# Patient Record
Sex: FEMALE | Race: NATIVE HAWAIIAN OR OTHER PACIFIC ISLANDER | NOT HISPANIC OR LATINO | URBAN - METROPOLITAN AREA
[De-identification: names, ages, dates, MRNs, and addresses within clinical notes are randomized per-mention and may not be internally consistent; named-entity substitution may affect disease eponyms.]

---

## 2020-05-05 ENCOUNTER — EMERGENCY (EMERGENCY)
Facility: HOSPITAL | Age: 40
LOS: 1 days | Discharge: HOME | End: 2020-05-05
Attending: EMERGENCY MEDICINE | Admitting: EMERGENCY MEDICINE
Payer: SELF-PAY

## 2020-05-05 VITALS
HEIGHT: 62 IN | SYSTOLIC BLOOD PRESSURE: 128 MMHG | DIASTOLIC BLOOD PRESSURE: 84 MMHG | RESPIRATION RATE: 18 BRPM | TEMPERATURE: 98 F | HEART RATE: 90 BPM | WEIGHT: 111.99 LBS | OXYGEN SATURATION: 99 %

## 2020-05-05 PROCEDURE — 96372 THER/PROPH/DIAG INJ SC/IM: CPT

## 2020-05-05 PROCEDURE — 99283 EMERGENCY DEPT VISIT LOW MDM: CPT | Mod: 25

## 2020-05-05 PROCEDURE — 99284 EMERGENCY DEPT VISIT MOD MDM: CPT

## 2020-05-05 RX ORDER — FAMOTIDINE 10 MG/ML
1 INJECTION INTRAVENOUS
Qty: 14 | Refills: 0
Start: 2020-05-05 | End: 2020-05-11

## 2020-05-05 RX ORDER — DIPHENHYDRAMINE HCL 50 MG
50 CAPSULE ORAL ONCE
Refills: 0 | Status: COMPLETED | OUTPATIENT
Start: 2020-05-05 | End: 2020-05-05

## 2020-05-05 RX ORDER — DEXAMETHASONE 0.5 MG/5ML
10 ELIXIR ORAL ONCE
Refills: 0 | Status: COMPLETED | OUTPATIENT
Start: 2020-05-05 | End: 2020-05-05

## 2020-05-05 RX ORDER — FAMOTIDINE 10 MG/ML
40 INJECTION INTRAVENOUS ONCE
Refills: 0 | Status: COMPLETED | OUTPATIENT
Start: 2020-05-05 | End: 2020-05-05

## 2020-05-05 RX ORDER — HYDROXYZINE HCL 10 MG
1 TABLET ORAL
Qty: 21 | Refills: 0
Start: 2020-05-05 | End: 2020-05-11

## 2020-05-05 RX ADMIN — FAMOTIDINE 40 MILLIGRAM(S): 10 INJECTION INTRAVENOUS at 18:02

## 2020-05-05 RX ADMIN — Medication 10 MILLIGRAM(S): at 18:02

## 2020-05-05 RX ADMIN — Medication 50 MILLIGRAM(S): at 18:02

## 2020-05-05 NOTE — ED PROVIDER NOTE - CLINICAL SUMMARY MEDICAL DECISION MAKING FREE TEXT BOX
pt w/allergic reaction - hives to l upper arm and r cheek, no vesicles/bites/superimposed inf, no rash to rest of body, no airway compromise or systemic symptoms, given decadron and h2/h1 blockers, allergy prevention discussed, to con't h1/h2 blockers, f/u w/derm or allergist for testing, strict return precautions given

## 2020-05-05 NOTE — ED PROVIDER NOTE - RESPIRATORY, MLM
Breath sounds clear and equal bilaterally. no rales, no rhonchi, no wheezes b/l, speaking rapidly in long, full sentences

## 2020-05-05 NOTE — ED PROVIDER NOTE - ENMT, MLM
Airway patent, Nasal mucosa clear. Mouth with normal mucosa. Throat has no vesicles, no oropharyngeal exudates and uvula is midline. no lesions to mucosa, no stridor, no cervical lymphadenopathy b/l

## 2020-05-05 NOTE — ED ADULT NURSE NOTE - CHPI ED NUR SYMPTOMS NEG
no nausea/no congestion/no difficulty breathing/no wheezing/no shortness of breath/no difficulty swallowing

## 2020-05-05 NOTE — ED ADULT NURSE NOTE - CHIEF COMPLAINT QUOTE
Patient in vascular at this time.      Roel Ndiaye RN  09/24/19 0528 pt arriving to ED for c/c "allergic reaction to R face and L arm" pt states she does not know of specific allergens, but states similar episode in the past. Notable swelling to R face and L upper arm. hx CVA in 2009. No new deficits.

## 2020-05-05 NOTE — ED ADULT TRIAGE NOTE - CHIEF COMPLAINT QUOTE
pt arriving to ED for c/c "allergic reaction to R face and L arm" pt states she does not know of specific allergens, but states similar episode in the past. Notable swelling to R face and L upper arm. hx CVA in 2009. No new deficits.

## 2020-05-05 NOTE — ED PROVIDER NOTE - OBJECTIVE STATEMENT
The pt is a 40 y/o F, who presents to ED c/o redness and itching to R face and L arm - started last night, pt proceeded to apply alcohol to the areas. States redness, swelling and "hotness", has not taken any benadryl, had a similar reaction few wks ago - took benadryl at that time w/good relief, has not f/u w/allergist or derm. No new meds, no new soap, no new detergent, no new foods. Denies sob, dyspnea, throat closing, n/v/d, abd pain, fevers, chills, dizziness, syncope

## 2020-05-05 NOTE — ED PROVIDER NOTE - SKIN, MLM
+ hive to L upper arm, no tend, no breaks in skin, no vesicles, no pustules, no tend, no ascending or descending lymphangitis, R face w/hives and wheels, min swelling to R cheek, no tend, no pustules, no bites or breaks in skin, no rash to rest of body, no TENS, no SJS

## 2020-05-05 NOTE — ED PROVIDER NOTE - PATIENT PORTAL LINK FT
You can access the FollowMyHealth Patient Portal offered by [facility] by registering at the following website: [email or web address]. By joining [employer]’s FollowMyHealth portal, you will also be able to view your health information using other applications (apps) compatible with our system.

## 2020-05-05 NOTE — ED PROVIDER NOTE - ATTENDING CONTRIBUTION TO CARE
pt with CARMEN arm reddness and itching , right facial itching, applied alcohol to areas and worsened, similar outbreak months ago which resolved at that time w Benedryl, otherwise no symptoms, no SOB, no GI symptoms, no new allergens identified, hive to LUE, no cellulitis, also hive to rt cheek , no uvular edema, lungs clear, plan steroids, benedryl pepcid, allergist f/u ,  emergent return instructions were discussed with patient

## 2020-08-24 NOTE — ED ADULT TRIAGE NOTE - INTERNATIONAL TRAVEL
No Graft Cartilage Fenestration Text: The cartilage was fenestrated with a 2mm punch biopsy to help facilitate graft survival and healing.

## 2020-09-18 ENCOUNTER — EMERGENCY (EMERGENCY)
Facility: HOSPITAL | Age: 40
LOS: 1 days | Discharge: HOME | End: 2020-09-18
Attending: EMERGENCY MEDICINE | Admitting: EMERGENCY MEDICINE
Payer: SELF-PAY

## 2020-09-18 VITALS
SYSTOLIC BLOOD PRESSURE: 149 MMHG | HEIGHT: 62 IN | DIASTOLIC BLOOD PRESSURE: 100 MMHG | HEART RATE: 108 BPM | TEMPERATURE: 98 F | WEIGHT: 111.99 LBS | RESPIRATION RATE: 16 BRPM | OXYGEN SATURATION: 99 %

## 2020-09-18 VITALS
SYSTOLIC BLOOD PRESSURE: 116 MMHG | DIASTOLIC BLOOD PRESSURE: 70 MMHG | HEART RATE: 78 BPM | RESPIRATION RATE: 16 BRPM | TEMPERATURE: 98 F | OXYGEN SATURATION: 99 %

## 2020-09-18 LAB
ALBUMIN SERPL ELPH-MCNC: 5.1 G/DL — HIGH (ref 3.3–5)
ALP SERPL-CCNC: 58 U/L — SIGNIFICANT CHANGE UP (ref 40–120)
ALT FLD-CCNC: 15 U/L — SIGNIFICANT CHANGE UP (ref 10–45)
ANION GAP SERPL CALC-SCNC: 13 MMOL/L — SIGNIFICANT CHANGE UP (ref 5–17)
APPEARANCE UR: CLEAR — SIGNIFICANT CHANGE UP
APTT BLD: 30.8 SEC — SIGNIFICANT CHANGE UP (ref 27.5–35.5)
AST SERPL-CCNC: 19 U/L — SIGNIFICANT CHANGE UP (ref 10–40)
BASOPHILS # BLD AUTO: 0.08 K/UL — SIGNIFICANT CHANGE UP (ref 0–0.2)
BASOPHILS NFR BLD AUTO: 0.8 % — SIGNIFICANT CHANGE UP (ref 0–2)
BILIRUB SERPL-MCNC: 0.6 MG/DL — SIGNIFICANT CHANGE UP (ref 0.2–1.2)
BILIRUB UR-MCNC: NEGATIVE — SIGNIFICANT CHANGE UP
BUN SERPL-MCNC: 15 MG/DL — SIGNIFICANT CHANGE UP (ref 7–23)
CALCIUM SERPL-MCNC: 9.3 MG/DL — SIGNIFICANT CHANGE UP (ref 8.4–10.5)
CHLORIDE SERPL-SCNC: 100 MMOL/L — SIGNIFICANT CHANGE UP (ref 96–108)
CK MB CFR SERPL CALC: 1.9 NG/ML — SIGNIFICANT CHANGE UP (ref 0–6.7)
CK SERPL-CCNC: 94 U/L — SIGNIFICANT CHANGE UP (ref 25–170)
CO2 SERPL-SCNC: 24 MMOL/L — SIGNIFICANT CHANGE UP (ref 22–31)
COLOR SPEC: YELLOW — SIGNIFICANT CHANGE UP
CREAT SERPL-MCNC: 0.52 MG/DL — SIGNIFICANT CHANGE UP (ref 0.5–1.3)
DIFF PNL FLD: NEGATIVE — SIGNIFICANT CHANGE UP
EOSINOPHIL # BLD AUTO: 0.22 K/UL — SIGNIFICANT CHANGE UP (ref 0–0.5)
EOSINOPHIL NFR BLD AUTO: 2.2 % — SIGNIFICANT CHANGE UP (ref 0–6)
GLUCOSE SERPL-MCNC: 125 MG/DL — HIGH (ref 70–99)
GLUCOSE UR QL: NEGATIVE — SIGNIFICANT CHANGE UP
HCG SERPL-ACNC: <0 MIU/ML — SIGNIFICANT CHANGE UP
HCG UR QL: NEGATIVE — SIGNIFICANT CHANGE UP
HCT VFR BLD CALC: 42.3 % — SIGNIFICANT CHANGE UP (ref 34.5–45)
HGB BLD-MCNC: 13.8 G/DL — SIGNIFICANT CHANGE UP (ref 11.5–15.5)
IMM GRANULOCYTES NFR BLD AUTO: 0.3 % — SIGNIFICANT CHANGE UP (ref 0–1.5)
INR BLD: 1.09 — SIGNIFICANT CHANGE UP (ref 0.88–1.16)
KETONES UR-MCNC: ABNORMAL MG/DL
LEUKOCYTE ESTERASE UR-ACNC: NEGATIVE — SIGNIFICANT CHANGE UP
LYMPHOCYTES # BLD AUTO: 2.45 K/UL — SIGNIFICANT CHANGE UP (ref 1–3.3)
LYMPHOCYTES # BLD AUTO: 24.1 % — SIGNIFICANT CHANGE UP (ref 13–44)
MCHC RBC-ENTMCNC: 31.4 PG — SIGNIFICANT CHANGE UP (ref 27–34)
MCHC RBC-ENTMCNC: 32.6 GM/DL — SIGNIFICANT CHANGE UP (ref 32–36)
MCV RBC AUTO: 96.4 FL — SIGNIFICANT CHANGE UP (ref 80–100)
MONOCYTES # BLD AUTO: 0.81 K/UL — SIGNIFICANT CHANGE UP (ref 0–0.9)
MONOCYTES NFR BLD AUTO: 8 % — SIGNIFICANT CHANGE UP (ref 2–14)
NEUTROPHILS # BLD AUTO: 6.56 K/UL — SIGNIFICANT CHANGE UP (ref 1.8–7.4)
NEUTROPHILS NFR BLD AUTO: 64.6 % — SIGNIFICANT CHANGE UP (ref 43–77)
NITRITE UR-MCNC: NEGATIVE — SIGNIFICANT CHANGE UP
NRBC # BLD: 0 /100 WBCS — SIGNIFICANT CHANGE UP (ref 0–0)
PH UR: 6.5 — SIGNIFICANT CHANGE UP (ref 5–8)
PLATELET # BLD AUTO: 199 K/UL — SIGNIFICANT CHANGE UP (ref 150–400)
POTASSIUM SERPL-MCNC: 3.4 MMOL/L — LOW (ref 3.5–5.3)
POTASSIUM SERPL-SCNC: 3.4 MMOL/L — LOW (ref 3.5–5.3)
PROT SERPL-MCNC: 7.7 G/DL — SIGNIFICANT CHANGE UP (ref 6–8.3)
PROT UR-MCNC: NEGATIVE MG/DL — SIGNIFICANT CHANGE UP
PROTHROM AB SERPL-ACNC: 13 SEC — SIGNIFICANT CHANGE UP (ref 10.6–13.6)
RBC # BLD: 4.39 M/UL — SIGNIFICANT CHANGE UP (ref 3.8–5.2)
RBC # FLD: 11.9 % — SIGNIFICANT CHANGE UP (ref 10.3–14.5)
SODIUM SERPL-SCNC: 137 MMOL/L — SIGNIFICANT CHANGE UP (ref 135–145)
SP GR SPEC: 1.01 — SIGNIFICANT CHANGE UP (ref 1–1.03)
TROPONIN T SERPL-MCNC: <0.01 NG/ML — SIGNIFICANT CHANGE UP (ref 0–0.01)
UROBILINOGEN FLD QL: 0.2 E.U./DL — SIGNIFICANT CHANGE UP
WBC # BLD: 10.15 K/UL — SIGNIFICANT CHANGE UP (ref 3.8–10.5)
WBC # FLD AUTO: 10.15 K/UL — SIGNIFICANT CHANGE UP (ref 3.8–10.5)

## 2020-09-18 PROCEDURE — 80053 COMPREHEN METABOLIC PANEL: CPT

## 2020-09-18 PROCEDURE — 81003 URINALYSIS AUTO W/O SCOPE: CPT

## 2020-09-18 PROCEDURE — 85025 COMPLETE CBC W/AUTO DIFF WBC: CPT

## 2020-09-18 PROCEDURE — 85730 THROMBOPLASTIN TIME PARTIAL: CPT

## 2020-09-18 PROCEDURE — 82553 CREATINE MB FRACTION: CPT

## 2020-09-18 PROCEDURE — 96374 THER/PROPH/DIAG INJ IV PUSH: CPT

## 2020-09-18 PROCEDURE — 93010 ELECTROCARDIOGRAM REPORT: CPT

## 2020-09-18 PROCEDURE — 84484 ASSAY OF TROPONIN QUANT: CPT

## 2020-09-18 PROCEDURE — 70450 CT HEAD/BRAIN W/O DYE: CPT | Mod: 26

## 2020-09-18 PROCEDURE — 84702 CHORIONIC GONADOTROPIN TEST: CPT

## 2020-09-18 PROCEDURE — 36415 COLL VENOUS BLD VENIPUNCTURE: CPT

## 2020-09-18 PROCEDURE — 93971 EXTREMITY STUDY: CPT

## 2020-09-18 PROCEDURE — 82550 ASSAY OF CK (CPK): CPT

## 2020-09-18 PROCEDURE — 85610 PROTHROMBIN TIME: CPT

## 2020-09-18 PROCEDURE — 81025 URINE PREGNANCY TEST: CPT

## 2020-09-18 PROCEDURE — 93971 EXTREMITY STUDY: CPT | Mod: 26,RT

## 2020-09-18 PROCEDURE — 70450 CT HEAD/BRAIN W/O DYE: CPT

## 2020-09-18 PROCEDURE — 99285 EMERGENCY DEPT VISIT HI MDM: CPT

## 2020-09-18 PROCEDURE — 93005 ELECTROCARDIOGRAM TRACING: CPT

## 2020-09-18 PROCEDURE — 99284 EMERGENCY DEPT VISIT MOD MDM: CPT | Mod: 25

## 2020-09-18 RX ORDER — POTASSIUM CHLORIDE 20 MEQ
10 PACKET (EA) ORAL ONCE
Refills: 0 | Status: COMPLETED | OUTPATIENT
Start: 2020-09-18 | End: 2020-09-18

## 2020-09-18 RX ORDER — SODIUM CHLORIDE 9 MG/ML
1000 INJECTION INTRAMUSCULAR; INTRAVENOUS; SUBCUTANEOUS ONCE
Refills: 0 | Status: COMPLETED | OUTPATIENT
Start: 2020-09-18 | End: 2020-09-18

## 2020-09-18 RX ORDER — POTASSIUM CHLORIDE 20 MEQ
40 PACKET (EA) ORAL ONCE
Refills: 0 | Status: COMPLETED | OUTPATIENT
Start: 2020-09-18 | End: 2020-09-18

## 2020-09-18 RX ADMIN — Medication 40 MILLIEQUIVALENT(S): at 19:09

## 2020-09-18 RX ADMIN — SODIUM CHLORIDE 1000 MILLILITER(S): 9 INJECTION INTRAMUSCULAR; INTRAVENOUS; SUBCUTANEOUS at 19:08

## 2020-09-18 RX ADMIN — Medication 100 MILLIEQUIVALENT(S): at 19:12

## 2020-09-18 NOTE — ED ADULT NURSE NOTE - OBJECTIVE STATEMENT
pt received into spot 20 A&Ox3 ambulatory with steady gait appears comfortable arrives via walk in triage for eval of generalized weakness feeling near syncopal at work lightheaded and LE weakness feeling like her legs were going to give out with anxiety like chest pressure not CP and palpitations, pt denies SOB cough runny nose sore throat fever chills head ache blurry vision N/V numbness/ tingling no abd pain diarrhea constipation or urinary symptoms. 12 lead ekg done nsr noted 18G placed to RAC labs drawn and sent pt in nad

## 2020-09-18 NOTE — ED ADULT TRIAGE NOTE - OTHER COMPLAINTS
hx of stroke 2009. reports bilateral leg weakness with some dizziness since yesterday morning. No facial droop, no slurring of speech, no numbness nor tingling. strength, sensation equal bilateral.

## 2020-09-18 NOTE — ED ADULT NURSE REASSESSMENT NOTE - NS ED NURSE REASSESS COMMENT FT1
pt remains in US at this time, CT resulted as noted, awaiting results on US and pt return for rpt VS and final disposition

## 2020-09-18 NOTE — ED PROVIDER NOTE - NSPTACCESSSVCSAPPTDETAILS_ED_ALL_ED_FT
Primary physician follow up in 2-3 days. Has no current primary  Neurology follow up in 2-3 days. Hx of CVA in the past.

## 2020-09-18 NOTE — ED ADULT NURSE NOTE - NSIMPLEMENTINTERV_GEN_ALL_ED
Implemented All Universal Safety Interventions:  [place] to call system. Call bell, personal items and telephone within reach. Instruct patient to call for assistance. Room bathroom lighting operational. Non-slip footwear when patient is off stretcher. Physically safe environment: no spills, clutter or unnecessary equipment. Stretcher in lowest position, wheels locked, appropriate side rails in place.

## 2020-09-18 NOTE — ED PROVIDER NOTE - CLINICAL SUMMARY MEDICAL DECISION MAKING FREE TEXT BOX
39 y/o F pt presents to ED with generalized weakness and R knee pain. Will check labs, obtain CXR, CT head, and RLE DVT study. Pt found to have mild hypokalemia, fluids, potassium IV and PO given.

## 2020-09-18 NOTE — ED PROVIDER NOTE - OBJECTIVE STATEMENT
41 y/o F pt with PMhx of CVA and no significant PSHx presents to ED c/o persistent generalized weakness since yesterday with RLE pain behind the knee. Pt used to be on ASA for a period of time but is no longer on any meds. Pt here out of concern due to hx of CVA in past. Denies numbness, tingling, vision changes, fever, cough, cold, congestion, chest pain, shortness of breath, or any other acute complaints. Pt is ambulatory at baseline.

## 2020-09-18 NOTE — ED PROVIDER NOTE - MUSCULOSKELETAL, MLM
Spine appears normal, range of motion is not limited, mild tenderness to palpation to posterior aspect of R knee.

## 2020-09-18 NOTE — ED PROVIDER NOTE - NSFOLLOWUPINSTRUCTIONS_ED_ALL_ED_FT
Dehydration    WHAT YOU NEED TO KNOW:    What is dehydration? Dehydration is a condition that develops when your body does not have enough fluid. You may become dehydrated if you do not drink enough water or lose too much fluid. Fluid loss may also cause loss of electrolytes (minerals), such as sodium.    What increases my risk for dehydration?   •Vomiting, diarrhea, or fever      •Being in the sun or heat for too long      •Sweating while playing sports      •Diseases, such as stroke, diabetes, or infections      •Medicines that cause you to lose water and salt, such as diuretics (water pills)      •Older age with decreased ability to sense thirst or to urinate      What are the signs and symptoms of dehydration?   •Dry eyes or mouth      •Increased thirst      •Dark yellow urine      •Urinating little or not at all      •Tiredness or body weakness      •Headache, dizziness, or confusion      •Irregular or fast breathing, fast or pounding heartbeat, and low blood pressure      •Sudden weight loss      How is dehydration diagnosed? Your healthcare provider will examine you and check your breathing and heartbeat. He or she will look at your eyes, skin, mouth, and tongue. He or she will ask you how much liquid you have been drinking, and how much you are urinating. Tell him or her if you have been vomiting or have diarrhea. Blood and urine tests are used to check your electrolyte levels. The tests may show the cause of your dehydration, such as infection or diabetes. They may also show if your kidneys are working correctly.    How is dehydration treated?   •Oral liquids: ?If you are mildly to moderately dehydrated, you may need an oral rehydration solution (ORS). This drink contains the right amount of salt, sugar, and minerals in water to replace body fluids. Ask your healthcare provider where you can get an ORS.       ?Drink an ORS in small amounts if you have been vomiting. If you vomit, wait 30 minutes and try again. Ask healthcare providers how much ORS you need when you are dehydrated and how often you should drink it.       ?A sports drink is not  the same as an ORS. Do not drink sports drinks without asking your healthcare provider.      ?Do not drink soft drinks or fruit juices. These can make your condition worse.       •You may receive fluid through an IV. Electrolytes may also be included in the fluid.      •Hypodermoclysis gives your body a large amount of water quickly. The water is given into the deepest layer of your skin. Ask your healthcare provider for more information about hypodermoclysis.      What can I do to prevent dehydration?   •Drink liquids as directed. Liquids that contain water, sugar, and minerals can help your body hold in fluid and help prevent dehydration. Drink liquids throughout the day, not just when you feel thirsty. Men should drink about 3 liters (13 eight-ounce cups) of liquid each day. Women should drink about 2 liters (9 eight-ounce cups) of liquid each day. Drink even more liquid if you will be outdoors, in the sun for a long time, or exercising.       •Stay cool. Limit the time you spend outdoors during the hottest part of the day. Dress in lightweight clothes.       •Keep track of how often you urinate. If you urinate less than usual or your urine is darker, drink more liquids.      When should I seek immediate care?   •You have a seizure.      •You are confused or cannot think clearly.      •You are extremely sleepy, or another person cannot wake you.       •You become dizzy or faint when you stand.      •You are not able to urinate.      •You have trouble breathing.      •You have a fast or irregular heartbeat.      •Your hands or feet are cold, or your face is pale.       When should I contact my healthcare provider?   •You have trouble drinking liquids because you are vomiting.      •Your symptoms get worse.      •You have a fever.       •You feel very weak or tired.      •You have questions or concerns about your condition or care.      CARE AGREEMENT:    You have the right to help plan your care. Learn about your health condition and how it may be treated. Discuss treatment options with your healthcare providers to decide what care you want to receive. You always have the right to refuse treatment.        © Copyright [email or web address] 2020           back to top                          © Copyright [email or web address] 2020               Hypokalemia    WHAT YOU NEED TO KNOW:    What is hypokalemia? Hypokalemia is a low level of potassium in your blood. Potassium helps control how your muscles, heart, and digestive system work. Hypokalemia occurs when your body loses too much potassium or does not absorb enough from food.     What causes hypokalemia?   •Diarrhea or vomiting      •Medicines, such as diuretics, blood pressure medicines, or antibiotics      •Excessive use of laxatives      •Anorexia or bulimia nervosa      •Medical conditions, such as Cushing syndrome or kidney disease      •Not eating enough foods that contain potassium       What are the signs and symptoms of hypokalemia? You may not have any signs or symptoms if you have mild hypokalemia. You may have any of the following if it is more severe:   •Fatigue      •Constipation      •Frequent urination or urinating large amounts      •Muscle cramps or skin tingling      •Muscle weakness      •Fast or irregular heartbeat      How is hypokalemia diagnosed?   •An EKG test records your heart rhythm and how fast your heart beats. It is used to check for an irregular heartbeat.      •Blood tests are done to check your potassium level.      How is hypokalemia treated? You will receive potassium to bring your levels back to normal. This may be given as a pill or IV. The amount of potassium you will be given depends on your potassium level.     What foods are high in potassium? Foods that are high in potassium include bananas, oranges, tomatoes, potatoes, and avocado. Rodrigues beans, turkey, salmon, lean beef, yogurt, and milk are also high in potassium. Ask your healthcare provider or dietitian for more information about foods that are high in potassium.     When should I seek immediate care?   •You cannot move your arm or leg.      •You have a fast or irregular heartbeat.      •You are too tired or weak to stand up.      When should I contact my healthcare provider?   •You are vomiting, or you have diarrhea.      •You have numbness or tingling in your arms or legs.      •Your symptoms do not go away or they get worse.      •You have questions or concerns about your condition or care.      CARE AGREEMENT:    You have the right to help plan your care. Learn about your health condition and how it may be treated. Discuss treatment options with your healthcare providers to decide what care you want to receive. You always have the right to refuse treatment.        © Copyright [email or web address] 2020           back to top                          © Copyright [email or web address] 2020

## 2020-09-18 NOTE — ED PROVIDER NOTE - PATIENT PORTAL LINK FT
You can access the FollowMyHealth Patient Portal offered by [facility] by registering at the following website: [email or web address]. By joining [employer]’s FollowMyHealth portal, you will also be able to view your health information using other applications (apps) compatible with our system.

## 2020-09-22 DIAGNOSIS — R53.1 WEAKNESS: ICD-10-CM

## 2020-09-22 DIAGNOSIS — E86.0 DEHYDRATION: ICD-10-CM

## 2021-01-20 ENCOUNTER — EMERGENCY (EMERGENCY)
Facility: HOSPITAL | Age: 41
LOS: 1 days | Discharge: HOME | End: 2021-01-20
Admitting: EMERGENCY MEDICINE
Payer: SELF-PAY

## 2021-01-20 VITALS
WEIGHT: 111.99 LBS | HEART RATE: 83 BPM | DIASTOLIC BLOOD PRESSURE: 76 MMHG | SYSTOLIC BLOOD PRESSURE: 117 MMHG | TEMPERATURE: 98 F | OXYGEN SATURATION: 97 % | RESPIRATION RATE: 16 BRPM | HEIGHT: 62 IN

## 2021-01-20 DIAGNOSIS — S61.242A PUNCTURE WOUND WITH FOREIGN BODY OF RIGHT MIDDLE FINGER WITHOUT DAMAGE TO NAIL, INITIAL ENCOUNTER: ICD-10-CM
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
## 2021-01-20 DIAGNOSIS — Z23 ENCOUNTER FOR IMMUNIZATION: ICD-10-CM

## 2021-01-20 PROCEDURE — 90715 TDAP VACCINE 7 YRS/> IM: CPT

## 2021-01-20 PROCEDURE — 10120 INC&RMVL FB SUBQ TISS SMPL: CPT

## 2021-01-20 PROCEDURE — 90471 IMMUNIZATION ADMIN: CPT

## 2021-01-20 PROCEDURE — 99283 EMERGENCY DEPT VISIT LOW MDM: CPT | Mod: 25

## 2021-01-20 PROCEDURE — 99284 EMERGENCY DEPT VISIT MOD MDM: CPT | Mod: 25

## 2021-01-20 RX ORDER — CEPHALEXIN 500 MG
500 CAPSULE ORAL ONCE
Refills: 0 | Status: COMPLETED | OUTPATIENT
Start: 2021-01-20 | End: 2021-01-20

## 2021-01-20 RX ORDER — CEPHALEXIN 500 MG
1 CAPSULE ORAL
Qty: 14 | Refills: 0
Start: 2021-01-20 | End: 2021-01-26

## 2021-01-20 RX ORDER — TETANUS TOXOID, REDUCED DIPHTHERIA TOXOID AND ACELLULAR PERTUSSIS VACCINE, ADSORBED 5; 2.5; 8; 8; 2.5 [IU]/.5ML; [IU]/.5ML; UG/.5ML; UG/.5ML; UG/.5ML
0.5 SUSPENSION INTRAMUSCULAR ONCE
Refills: 0 | Status: COMPLETED | OUTPATIENT
Start: 2021-01-20 | End: 2021-01-20

## 2021-01-20 RX ADMIN — Medication 500 MILLIGRAM(S): at 19:27

## 2021-01-20 RX ADMIN — TETANUS TOXOID, REDUCED DIPHTHERIA TOXOID AND ACELLULAR PERTUSSIS VACCINE, ADSORBED 0.5 MILLILITER(S): 5; 2.5; 8; 8; 2.5 SUSPENSION INTRAMUSCULAR at 19:27

## 2021-01-20 NOTE — ED PROVIDER NOTE - CLINICAL SUMMARY MEDICAL DECISION MAKING FREE TEXT BOX
39 yo female in the ER due to pain to her right 3rd fingertip, concerning for possible splinter. Pt had removed some splinter from her finger 3 days ago but it still painful. Splinter has been removed using local anesthesia and small incision. wound care explained to pt and she is comfortable for d/c.

## 2021-01-20 NOTE — ED PROVIDER NOTE - PATIENT PORTAL LINK FT
You can access the FollowMyHealth Patient Portal offered by [facility] by registering at the following website: [email or web address]. By joining [employer]s FollowMyHealth portal, you will also be able to view your health information using other applications (apps) compatible with our system.

## 2021-01-20 NOTE — ED ADULT TRIAGE NOTE - CHIEF COMPLAINT QUOTE
Pt states 1 week ago, she got a splinter in her right third finger and tried to remove it but noticed 3 days ago, site was becoming red and swollen. Pt states, "I think it's still in there." Denies fevers/chills. Full range of motion.

## 2021-01-20 NOTE — ED PROVIDER NOTE - NSFOLLOWUPINSTRUCTIONS_ED_ALL_ED_FT
Skin Foreign Body    A skin foreign body is an object that is stuck in the skin. Common objects that get stuck in the skin include:  •Wood (splinter).      •Glass.      •Rock.      •Nails.      •Needles.      •Thorns or cactus spines.      •Fiberglass slivers.      •Fish hooks.      •BBs.      Foreign bodies may damage tissue or cause infection. If the foreign body does not cause any pain or infection, it may be okay to leave it in the skin.    A growth called a granuloma may form around a foreign body that is left in the skin.      What are the causes?    This condition is caused by an object getting lodged under the skin, usually by accident. Children may get a skin foreign body while playing outside. Adults may get a skin foreign body after breaking glass or while working with wood, fiberglass, or stone material. In some cases, the object may get stuck in an open wound after an injury.      What are the signs or symptoms?  Symptoms of this condition include:  •Pain.      •A feeling of something being stuck under the skin.        How is this diagnosed?  This condition is diagnosed based on:  •Your medical history and symptoms.      •A physical exam.    •Imaging tests, such as:  •X-rays.      •CT scans.      •Ultrasounds.          How is this treated?  Treatment for this condition depends on what the foreign body is, where it is, and whether it is causing infection or other symptoms. Treatment may involve:  •Flushing the affected area with a salt-water solution to remove dirt or debris.      •Removing all or part of the object with a needle and metal tweezers. In some cases, an incision may be made in the skin to allow access to the object.      •Waiting to remove the object until it moves closer to the surface of the skin. This may take several days.      •Leaving the object in place. This may be done if the object is not causing any symptoms or if removal will cause more damage to the skin or tissue.      •Taking antibiotic pills or using antibiotic ointment to treat or prevent infection.      •Having a surgical procedure to remove a foreign body that is deep inside the tissue or that has been covered by a granuloma.        Follow these instructions at home:      Wound or incision care    •If the foreign body was removed, follow instructions from your health care provider about how to take care of your wound or incision. Make sure you:  •Wash your hands with soap and water before and after you change your bandage (dressing). If soap and water are not available, use hand .      •Change your dressing as told by your health care provider.      •Leave stitches (sutures), skin glue, or adhesive strips in place. These skin closures may need to stay in place for 2 weeks or longer. If adhesive strip edges start to loosen and curl up, you may trim the loose edges. Do not remove adhesive strips completely unless your health care provider tells you to do that.      •Check your wound or incision every day for signs of infection. This is especially important if the foreign body was left in place in the skin. Check for:  •Redness, swelling, or pain.      •Fluid or blood.      •Pus or a bad smell.      •Warmth.        •If the foreign body was in your lip, you may be directed to rinse your mouth with a salt-water mixture 3–4 times per day or as needed. To make a salt–water mixture, completely dissolve ½–1 tsp (3–6 g) of salt in 1 cup (237 mL) of warm water.      General instructions     •Take over-the-counter and prescription medicines only as told by your health care provider.      •If you were prescribed an antibiotic medicine or ointment, use it as told by your health care provider. Do not stop using the antibiotic even if you start to feel better.      •Keep all follow-up visits as told by your health care provider. This is important.        Contact a health care provider if:    •You develop more pain or other new symptoms around the area where the object entered the skin.      •You have redness, swelling, or pain around your wound or incision.      •You have fluid or blood coming from your wound or incision.      •Your wound or incision feels warm to the touch.      •You have pus or a bad smell coming from your wound or incision.      •You have a fever.        Get help right away if:    •You have severe pain that does not get better with medicine.        Summary    •A skin foreign body is an object that is stuck in the skin. Common objects that get stuck in the skin include wood, glass, rock, thorns, and fiberglass slivers.      •Treatment for this condition depends on what the foreign body is, where it is, and whether it is causing infection or other symptoms.      •Treatment may include removing the foreign body or leaving it in place. It is important to watch the wound or incision for signs of infection, especially if the object was left in place in the skin.      This information is not intended to replace advice given to you by your health care provider. Make sure you discuss any questions you have with your health care provider.

## 2021-01-20 NOTE — ED PROVIDER NOTE - OBJECTIVE STATEMENT
39 yo female in the ER c/o splinter in her right 3rd finger. Pt reports she removed a part of it 3 days ago but concerned that some still be in her finger. Pt reports pain and slight swelling to her injured finger, also noted yellowish discolorations at the site of possible splinter.

## 2021-01-20 NOTE — ED PROVIDER NOTE - SKIN, MLM
Skin normal color for race, warm, dry and intact. No evidence of rash.  small localized infection at the puncture site to right 3rd distal phalanx, palmar aspect

## 2021-01-20 NOTE — ED ADULT NURSE NOTE - PAIN: BODY LOCATION
Pt presents to the ED via triage with CC of fatigue, CP and N/V/D. Reports CP \"comes and goes, lasting 1 hour\". Denies CP currently. Reports symptoms have been going on for days. Now reports abdominal pain that started today and blood in urine. Reports nausea \"only when drinking water too fast\".   
right 3rd finger

## 2021-01-21 PROBLEM — I63.9 CEREBRAL INFARCTION, UNSPECIFIED: Chronic | Status: ACTIVE | Noted: 2020-09-18

## 2023-04-20 NOTE — ED ADULT NURSE NOTE - NS PRO PASSIVE SMOKE EXP
Unknown Information: Selecting Yes will display possible errors in your note based on the variables you have selected. This validation is only offered as a suggestion for you. PLEASE NOTE THAT THE VALIDATION TEXT WILL BE REMOVED WHEN YOU FINALIZE YOUR NOTE. IF YOU WANT TO FAX A PRELIMINARY NOTE YOU WILL NEED TO TOGGLE THIS TO 'NO' IF YOU DO NOT WANT IT IN YOUR FAXED NOTE.

## 2023-08-02 NOTE — ED ADULT TRIAGE NOTE - HEIGHT IN INCHES
2 Klisyri Pregnancy And Lactation Text: It is unknown if this medication can harm a developing fetus or if it is excreted in breast milk.

## 2024-03-04 ENCOUNTER — EMERGENCY (EMERGENCY)
Facility: HOSPITAL | Age: 44
LOS: 1 days | Discharge: HOME | End: 2024-03-04
Attending: EMERGENCY MEDICINE | Admitting: EMERGENCY MEDICINE
Payer: SELF-PAY

## 2024-03-04 VITALS
HEIGHT: 62 IN | HEART RATE: 112 BPM | DIASTOLIC BLOOD PRESSURE: 89 MMHG | WEIGHT: 110.01 LBS | SYSTOLIC BLOOD PRESSURE: 138 MMHG | TEMPERATURE: 98 F | OXYGEN SATURATION: 98 % | RESPIRATION RATE: 20 BRPM

## 2024-03-04 VITALS
OXYGEN SATURATION: 97 % | RESPIRATION RATE: 20 BRPM | HEART RATE: 80 BPM | SYSTOLIC BLOOD PRESSURE: 119 MMHG | DIASTOLIC BLOOD PRESSURE: 85 MMHG | TEMPERATURE: 98 F

## 2024-03-04 PROCEDURE — 70496 CT ANGIOGRAPHY HEAD: CPT | Mod: MC

## 2024-03-04 PROCEDURE — 80053 COMPREHEN METABOLIC PANEL: CPT

## 2024-03-04 PROCEDURE — 96374 THER/PROPH/DIAG INJ IV PUSH: CPT | Mod: XU

## 2024-03-04 PROCEDURE — 99285 EMERGENCY DEPT VISIT HI MDM: CPT

## 2024-03-04 PROCEDURE — 70496 CT ANGIOGRAPHY HEAD: CPT | Mod: 26,MC

## 2024-03-04 PROCEDURE — 70498 CT ANGIOGRAPHY NECK: CPT | Mod: MC

## 2024-03-04 PROCEDURE — 70450 CT HEAD/BRAIN W/O DYE: CPT | Mod: MC

## 2024-03-04 PROCEDURE — 99284 EMERGENCY DEPT VISIT MOD MDM: CPT | Mod: 25

## 2024-03-04 PROCEDURE — 36415 COLL VENOUS BLD VENIPUNCTURE: CPT

## 2024-03-04 PROCEDURE — 70498 CT ANGIOGRAPHY NECK: CPT | Mod: 26,MC

## 2024-03-04 PROCEDURE — 85025 COMPLETE CBC W/AUTO DIFF WBC: CPT

## 2024-03-04 PROCEDURE — 70450 CT HEAD/BRAIN W/O DYE: CPT | Mod: 26,MC

## 2024-03-04 RX ORDER — KETOROLAC TROMETHAMINE 30 MG/ML
15 SYRINGE (ML) INJECTION ONCE
Refills: 0 | Status: DISCONTINUED | OUTPATIENT
Start: 2024-03-04 | End: 2024-03-04

## 2024-03-04 RX ORDER — BACITRACIN 500 [USP'U]/G
1 OINTMENT OPHTHALMIC
Qty: 1 | Refills: 0
Start: 2024-03-04 | End: 2024-03-10

## 2024-03-04 RX ORDER — SODIUM CHLORIDE 9 MG/ML
1000 INJECTION INTRAMUSCULAR; INTRAVENOUS; SUBCUTANEOUS ONCE
Refills: 0 | Status: COMPLETED | OUTPATIENT
Start: 2024-03-04 | End: 2024-03-04

## 2024-03-04 RX ADMIN — Medication 15 MILLIGRAM(S): at 10:06

## 2024-03-04 RX ADMIN — SODIUM CHLORIDE 1000 MILLILITER(S): 9 INJECTION INTRAMUSCULAR; INTRAVENOUS; SUBCUTANEOUS at 10:07

## 2024-03-04 NOTE — ED PROVIDER NOTE - NSFOLLOWUPINSTRUCTIONS_ED_ALL_ED_FT
1.93 ACUTE HEADACHE - General Information    Acute Headache    WHAT YOU NEED TO KNOW:    What is an acute headache? An acute headache is pain or discomfort that may start suddenly and get worse quickly. You may have an acute headache only when you feel stress or eat certain foods. Other acute headache pain can happen every day, and sometimes several times a day.    What are the most common types of acute headache?    Tension headache is the most common type of headache. These headaches typically occur in the late afternoon and go away by evening. The pain is usually mild or moderate. You may have problems tolerating bright light or loud noise. The pain is usually across the forehead or in the back of the head, often only on one side. These headaches may occur every day.    Migraine headaches cause moderate or severe pain. The headache generally lasts from 1 to 3 days and tends to come back. Pain is usually on only one side, but it may change sides. Migraines often occur in the temple, the back of the head, or behind the eye. The pain may throb or be sharp and steady.    A migraine with aura means you see or feel something before a migraine. You may see a small spot surrounded by bright zigzag lines. Other signs or symptoms may follow the aura.    Cluster headache pain is usually only on one side. It often causes severe pain, and can last for 30 minutes to 2 hours. These headaches may occur 1 or 2 times each day, more often at night. The pain may wake you.  Headache Types  What causes acute headaches? The cause of your headache may not be known. The following can trigger a headache:    Stress or tension, hours or even days after stressful events    Fatigue, a lack of sleep or changes in your usual sleep pattern, or a nap during the day    Menstruation, especially after pregnancy, or use of birth control pills or hormone replacement therapy    Food such as cured meats, artificial sweeteners, alcohol, dark chocolate, and MSG    Suddenly not having caffeine if you usually have larger amounts    A medical problem, such as an infection, tooth pain, neck or sinus pain, thyroid problems, or a tumor    A head injury  How is the type of acute headache diagnosed and treated? Your healthcare provider will ask you to describe your pain and rate it on a scale from 1 to 10. Tell the provider how often you have headaches and how long they last. Also describe any other symptoms you have along with headaches, such as dizziness or blurred vision. You may need tests including a CT scan to make sure there is not a leak in any blood vessels.    Medicines may be given to manage or prevent headaches. The medicine will depend on the type of acute headache you have. Do not wait until the pain is severe before you take your medicine. You may be able to take over-the-counter pain medicines as needed. Examples include NSAIDs and acetaminophen. Ask your healthcare provider which medicine is right for you. Ask how much to take and when to take it. Follow directions. These medicines can cause stomach bleeding or kidney or liver damage if not taken correctly.    Biofeedback may be used to help you manage stress. Electrodes (wires) are placed on your body and attached to a monitor. You will learn how to change stress reactions. For example, you learn to slow your heart rate when you become upset.    Cognitive behavior therapy, or stress management, may be used with other therapies to prevent headaches.  What can I do to manage my symptoms?    Apply heat or ice on the headache area. Use a heat or ice pack. For an ice pack, you can also put crushed ice in a plastic bag. Cover the pack or bag with a towel before you apply it to your skin. Ice and heat both help decrease pain, and heat also helps decrease muscle spasms. Apply heat for 20 to 30 minutes every 2 hours. Apply ice for 15 to 20 minutes every hour. Apply heat or ice for as long and for as many days as directed. You may alternate heat and ice.    Relax your muscles. Lie down in a comfortable position and close your eyes. Relax your muscles slowly. Start at your toes and work your way up your body.    Keep a record of your headaches. Write down when your headaches start and stop. Include your symptoms and what you were doing when the headache began. Record what you ate or drank for 24 hours before the headache started. Describe the pain and where it hurts. Keep track of what you did to treat your headache and if it worked.  What can I do to prevent an acute headache?    Avoid anything that triggers an acute headache. Examples include exposure to chemicals, going to high altitude, or not getting enough sleep. Create a regular sleep routine. Go to sleep at the same time and wake up at the same time each day. Do not use electronic devices before bedtime. These may trigger a headache or prevent you from sleeping well.    Do not smoke. Nicotine and other chemicals in cigarettes and cigars can trigger an acute headache or make it worse. Ask your healthcare provider for information if you currently smoke and need help to quit. E-cigarettes or smokeless tobacco still contain nicotine. Talk to your healthcare provider before you use these products.    Limit alcohol as directed. Alcohol can trigger an acute headache or make it worse. If you have cluster headaches, do not drink alcohol during an episode. For other types of headaches, ask your healthcare provider if it is safe for you to drink alcohol. Ask how much is safe for you to drink, and how often.    Exercise as directed. Exercise can reduce tension and help with headache pain. Aim for 30 minutes of physical activity on most days of the week. Your healthcare provider can help you create an exercise plan.    Eat a variety of healthy foods. Healthy foods include fruits, vegetables, low-fat dairy products, lean meats, fish, whole grains, and cooked beans. Your healthcare provider or dietitian can help you create meals plans if you need to avoid foods that trigger headaches.  When should I seek immediate care?    You have severe pain.    You have numbness or weakness on one side of your face or body.    You have a headache that occurs after a blow to the head, a fall, or other trauma.    You have a headache, are forgetful or confused, or have trouble speaking.    You have a headache, stiff neck, and a fever.  When should I call my doctor?    You have a constant headache and are vomiting.    You have a headache each day that does not get better, even after treatment.    You have changes in your headaches, or new symptoms that occur when you have a headache.    You have questions or concerns about your condition or care.  CARE AGREEMENT:    You have the right to help plan your care. Learn about your health condition and how it may be treated. Discuss treatment options with your healthcare providers to decide what care you want to receive. You always have the right to refuse treatment.    © [employer] 1973, 2024    	  back to top            © [employer] 1973, 2024

## 2024-03-04 NOTE — ED PROVIDER NOTE - PATIENT PORTAL LINK FT
You can access the FollowMyHealth Patient Portal offered by [facility] by registering at the following website: [email or web address]. By joining [employer]’s FollowMyHealth portal, you will also be able to view your health information using other applications (apps) compatible with our system.

## 2024-03-04 NOTE — ED PROVIDER NOTE - NEUROLOGICAL, MLM
Alert and oriented, no focal deficits, no motor or sensory deficits. cn/pn intact, stroke score zero, nml gait, neg rhomberg/pronator

## 2024-03-04 NOTE — ED ADULT NURSE NOTE - OBJECTIVE STATEMENT
43yF pmhx CVA no deficits pt stopped taking her qd aspirin "years ago" presents to the ER complaining of an intermittent headache that started on Thursday. Pt states that since Thursday she has sharp shooting pain that last for a few seconds and then resides. Pt also complaining of redness to her left eye, denies any changes in her vision. Also endorses general malaise and weakness. Pt reports some changes in sensation on her right side since waking up this morning @5am, pt upgraded to [place]. Denies F/C, Cough, N/V/D, changes in vision.

## 2024-03-04 NOTE — ED PROVIDER NOTE - EYES, MLM
Clear bilaterally, pupils equal, round and reactive to light. L eye - lat redness w raised blood vessels- appears pinguecula like

## 2024-03-04 NOTE — ED ADULT NURSE NOTE - OTHER COMPLAINTS
Pt arrived to the ER for HA since Thursday on/off w/ associated left eye redness. Pt reports no->visual changes, trauma to affected eye, dizziness, and denies any other medical complaints. Pt states not taking blood thinners. Pt also reports hx of CVA (2009) w/ no residual deficits. Pt is noted to be axo4, able to maintain airway, having nonlabored breathing, no retractions noted, fully mobile in steady gait, non diaphoretic and able to talk in clear full sentences.

## 2024-03-04 NOTE — ED PROVIDER NOTE - CLINICAL SUMMARY MEDICAL DECISION MAKING FREE TEXT BOX
43 F w - new lat eye redness- prob pinguecula- rx eye gtts - fu w eye doctor  atypical ha in px w prior pfo- no tingling/numbness- no loss of strength/sensation- after IV placement on R arm- feels slightly decreaseed sensation- just on skin near the iv- check labs, ct head, control pain

## 2024-03-04 NOTE — ED PROVIDER NOTE - OBJECTIVE STATEMENT
43 F co ha- int sharp ha on the R side- sharp pain lasting for a few seconds no n/v took alleve the first day- sx started a few days ago  she has pmh PFO w clot on L side of her brain- felt mild gen weakness- denies tingling/numbness to specific side/part of her body  mild-mod severity- currently without piña  was told to take daily asa- has not been taking

## 2024-03-04 NOTE — ED ADULT TRIAGE NOTE - OTHER COMPLAINTS
Pt arrived to the ER for HA since Thursday on/off w/ associated left eye redness. Pt reports no visual changes, trauma to affected eye, dizziness, and denies any other medical complaints. Pt states not taking blood thinners. Pt also reports hx of CVA (2009). Pt is noted to be axo4, able to maintain airway, having nonlabored breathing, no retractions noted, fully mobile in steady gait, non diaphoretic and able to talk in clear full sentences. Pt arrived to the ER for HA since Thursday on/off w/ associated left eye redness. Pt reports no->visual changes, trauma to affected eye, dizziness, and denies any other medical complaints. Pt states not taking blood thinners. Pt also reports hx of CVA (2009) w/ no residual deficits. Pt is noted to be axo4, able to maintain airway, having nonlabored breathing, no retractions noted, fully mobile in steady gait, non diaphoretic and able to talk in clear full sentences.

## 2024-03-07 DIAGNOSIS — Z91.148 PATIENT'S OTHER NONCOMPLIANCE WITH MEDICATION REGIMEN FOR OTHER REASON: ICD-10-CM

## 2024-03-07 DIAGNOSIS — T39.016A UNDERDOSING OF ASPIRIN, INITIAL ENCOUNTER: ICD-10-CM

## 2024-03-07 DIAGNOSIS — R51.9 HEADACHE, UNSPECIFIED: ICD-10-CM

## 2024-03-07 DIAGNOSIS — R53.1 WEAKNESS: ICD-10-CM

## 2024-03-07 DIAGNOSIS — H57.89 OTHER SPECIFIED DISORDERS OF EYE AND ADNEXA: ICD-10-CM

## 2025-06-22 NOTE — ED ADULT TRIAGE NOTE - HEIGHT IN FEET
Pt denies suicidal / homicidal ideations.   Pt denies hallucinations.  Pt is cooperative and appears to be pleasant.   Eye contact is good, speech is clear.   Negative immediate behavioral concerns at this time.    5